# Patient Record
Sex: FEMALE | Race: WHITE | NOT HISPANIC OR LATINO | Employment: OTHER | URBAN - METROPOLITAN AREA
[De-identification: names, ages, dates, MRNs, and addresses within clinical notes are randomized per-mention and may not be internally consistent; named-entity substitution may affect disease eponyms.]

---

## 2017-07-07 ENCOUNTER — ALLSCRIPTS OFFICE VISIT (OUTPATIENT)
Dept: OTHER | Facility: OTHER | Age: 77
End: 2017-07-07

## 2017-07-07 DIAGNOSIS — Z12.31 ENCOUNTER FOR SCREENING MAMMOGRAM FOR MALIGNANT NEOPLASM OF BREAST: ICD-10-CM

## 2017-07-07 DIAGNOSIS — N64.4 MASTODYNIA: ICD-10-CM

## 2017-07-10 ENCOUNTER — TRANSCRIBE ORDERS (OUTPATIENT)
Dept: ADMINISTRATIVE | Facility: HOSPITAL | Age: 77
End: 2017-07-10

## 2017-07-10 DIAGNOSIS — R01.1 CARDIAC MURMUR: Primary | ICD-10-CM

## 2017-07-11 ENCOUNTER — HOSPITAL ENCOUNTER (OUTPATIENT)
Dept: MAMMOGRAPHY | Facility: CLINIC | Age: 77
Discharge: HOME | End: 2017-07-11
Payer: MEDICARE

## 2017-07-11 DIAGNOSIS — N64.4 MASTODYNIA: ICD-10-CM

## 2017-07-11 DIAGNOSIS — Z12.31 ENCOUNTER FOR MAMMOGRAM TO ESTABLISH BASELINE MAMMOGRAM: ICD-10-CM

## 2017-07-11 PROCEDURE — 77063 BREAST TOMOSYNTHESIS BI: CPT

## 2017-07-11 PROCEDURE — G0206 DX MAMMO INCL CAD UNI: HCPCS

## 2017-07-11 PROCEDURE — G0202 SCR MAMMO BI INCL CAD: HCPCS

## 2017-07-11 PROCEDURE — G0279 TOMOSYNTHESIS, MAMMO: HCPCS

## 2017-07-19 ENCOUNTER — HOSPITAL ENCOUNTER (OUTPATIENT)
Dept: NON INVASIVE DIAGNOSTICS | Facility: HOSPITAL | Age: 77
Discharge: HOME | End: 2017-07-19
Payer: MEDICARE

## 2017-07-19 DIAGNOSIS — R01.1 CARDIAC MURMUR: ICD-10-CM

## 2017-07-19 PROCEDURE — 93306 TTE W/DOPPLER COMPLETE: CPT

## 2017-07-26 ENCOUNTER — GENERIC CONVERSION - ENCOUNTER (OUTPATIENT)
Dept: OTHER | Facility: OTHER | Age: 77
End: 2017-07-26

## 2017-09-19 ENCOUNTER — HOSPITAL ENCOUNTER (OUTPATIENT)
Dept: INFUSION CENTER | Facility: HOSPITAL | Age: 77
Discharge: HOME | End: 2017-09-19
Payer: MEDICARE

## 2017-09-19 VITALS
RESPIRATION RATE: 16 BRPM | HEART RATE: 64 BPM | SYSTOLIC BLOOD PRESSURE: 159 MMHG | TEMPERATURE: 97.1 F | DIASTOLIC BLOOD PRESSURE: 60 MMHG

## 2017-09-19 PROCEDURE — 96401 CHEMO ANTI-NEOPL SQ/IM: CPT

## 2017-09-19 RX ADMIN — DENOSUMAB 60 MG: 60 INJECTION SUBCUTANEOUS at 13:41

## 2018-01-12 NOTE — RESULT NOTES
Verified Results  MAMMO SCREENING LEFT W 3D & CAD [id number] 22:35CV Paul Hatch     Test Name Result Flag Reference   MAMMO SCREENING LEFT W 3D & CAD (Report)     Patient History:   Patient is postmenopausal    Family history of endometrial cancer at age 36 in mother, unknown   cancer at age [de-identified] in father  Took hormonal contraceptives for 3 years  Took estrogen for 17    years  Patient is a former smoker, and smoked for 5 years  Patient's    BMI is 20 9  Reason for exam: clinical finding  Mammo Diagnostic Right W DBT and CAD: July 11, 2017 - Check In #:   [de-identified]   2D/3D Procedure   3D views: MLO view(s) were taken of the right breast    2D views: CC and MLO view(s) were taken of the right breast        Technologist: ZAYRA Pat (R)(M)   There are scattered fibroglandular densities  No dominant soft tissue mass, architectural distortion or    suspicious calcifications are noted in either breast  The skin    and nipple contours are within normal limits  No significant changes when compared with prior studies  Mammo Screening Left W DBT and CAD: July 11, 2017 - Check In #:    [de-identified]   2D/3D Procedure   3D views: MLO view(s) were taken of the left breast    2D views: CC and MLO view(s) were taken of the left breast        Technologist: ZAYRA Pat (R)(M)   There are scattered fibroglandular densities  Bilateral digital mammography was performed as a baseline study  No dominant soft tissue mass, architectural distortion or    suspicious calcifications are noted in either breast   The skin    and nipple contours are within normal limits  ACR BI-RADSï¾® Assessments: BiRad:1 - Negative (Overall)   Right breast Right Diag Mamm: Right breast is BiRad:1 - negative  Left breast 3-D L Scrn Mamm: Left breast is BiRad:1 - negative  Recommendation:   Routine screening mammogram of both breasts in 1 year  A    reminder letter will be scheduled       The patient is scheduled in a reminder system  8-10% of cancers will be missed on mammography  Management of a    palpable abnormality must be based on clinical grounds  Patients   will be notified of their results via letter from our facility  Accredited by [employer] and FDA  Transcription Location: ZAYRA Andersen 98: [id number]     Risk Value(s):   Tyrer-Cuzick 10 Year: 2 700%, Tyrer-Cuzick Lifetime: 2 700%,    Myriad Table: 1 5%, CHRISTINA 5 Year: 1 4%, NCI Lifetime: 2 9%     [facility] DIAGNOSTIC RIGHT W 3D & CAD [id number] 44:55OT Creola Rast Order Number: [id number]    - Patient Instructions: To schedule this appointment, please contact Central Scheduling at [phone]  Do not wear any perfume, powder, lotion or deodorant on breast or underarm area  Please bring your doctors order, referral (if needed) and insurance information with you on the day of the test  Failure to bring this information may result in this test being rescheduled  Arrive 15 minutes prior to your appointment time to register  On the day of your test, please bring any prior mammogram or breast studies with you that were not performed at a [facility]  Failure to bring prior exams may result in your test needing to be rescheduled  Test Name Result Flag Reference   [facility] DIAGNOSTIC RIGHT W 3D & CAD (Report)     Patient History:   Patient is postmenopausal    Family history of endometrial cancer at age 36 in mother, unknown   cancer at age [de-identified] in father  Took hormonal contraceptives for 3 years  Took estrogen for 17    years  Patient is a former smoker, and smoked for 5 years  Patient's    BMI is 20 9  Reason for exam: clinical finding       Mammo Diagnostic Right W DBT and CAD: July 11, 2017 - Check In #:   [de-identified]   2D/3D Procedure   3D views: MLO view(s) were taken of the right breast    2D views: CC and MLO view(s) were taken of the right breast        Technologist: Kishan Mccord R  T (R)(M)   There are scattered fibroglandular densities  No dominant soft tissue mass, architectural distortion or    suspicious calcifications are noted in either breast  The skin    and nipple contours are within normal limits  No significant changes when compared with prior studies  Mammo Screening Left W DBT and CAD: July 11, 2017 - Check In #:    [de-identified]   2D/3D Procedure   3D views: MLO view(s) were taken of the left breast    2D views: CC and MLO view(s) were taken of the left breast        Technologist: ZAYRA Olea T (R)(M)   There are scattered fibroglandular densities  Bilateral digital mammography was performed as a baseline study  No dominant soft tissue mass, architectural distortion or    suspicious calcifications are noted in either breast   The skin    and nipple contours are within normal limits  ACR BI-RADSï¾® Assessments: BiRad:1 - Negative (Overall)   Right breast Right Diag Mamm: Right breast is BiRad:1 - negative  Left breast 3-D L Scrn Mamm: Left breast is BiRad:1 - negative  Recommendation:   Routine screening mammogram of both breasts in 1 year  A    reminder letter will be scheduled  The patient is scheduled in a reminder system  8-10% of cancers will be missed on mammography  Management of a    palpable abnormality must be based on clinical grounds  Patients   will be notified of their results via letter from our facility  Accredited by [employer] and FDA       Transcription Location: ZAYRA Andersen 98: [id number]     Risk Value(s):   Tyrer-Cuzick 10 Year: 2 700%, Tyrer-Cuzick Lifetime: 2 700%,    Myriad Table: 1 5%, CHRISTINA 5 Year: 1 4%, NCI Lifetime: 2 9%

## 2018-01-14 VITALS
SYSTOLIC BLOOD PRESSURE: 124 MMHG | BODY MASS INDEX: 20.82 KG/M2 | HEIGHT: 63 IN | DIASTOLIC BLOOD PRESSURE: 84 MMHG | WEIGHT: 117.5 LBS

## 2018-01-14 NOTE — RESULT NOTES
Message   Recorded as Task   Date: 08/05/2016 02:57 PM, Created By: Dulce Dinh   Task Name: Document Appointment   Assigned To: Dulce Dinh   Regarding Patient: Brielle Caldwell, Status: Active   Comment:    Dulce Dinh - 05 Aug 2016 2:57 PM     TASK CREATED  Pt called stated she will to cx'l her proc for 08 10 2016; due to having cold feet; she would like to think about it for a few weeks and will call back when ready          Signatures   Electronically signed by : Sun Daily, ; Aug  8 2016  2:10PM EST                       (Author)

## 2018-01-16 NOTE — RESULT NOTES
Message   Recorded as Task   Date: 08/05/2016 02:57 PM, Created By: Darrian Park   Task Name: Document Appointment   Assigned To: Darrian Park   Regarding Patient: Madai Gold, Status: Active   Comment:    Darrian Park - 05 Aug 2016 2:57 PM     TASK CREATED  Pt called stated she will to cx'l her proc for 08 10 2016; due to having cold feet; she would like to think about it for a few weeks and will call back when ready          Signatures   Electronically signed by : Rica López, ; Aug  5 2016  2:57PM EST                       (Author)

## 2018-05-07 ENCOUNTER — HOSPITAL ENCOUNTER (OUTPATIENT)
Dept: INFUSION CENTER | Facility: HOSPITAL | Age: 78
Discharge: HOME | End: 2018-05-07
Payer: MEDICARE

## 2018-05-07 PROCEDURE — 96401 CHEMO ANTI-NEOPL SQ/IM: CPT

## 2018-05-07 RX ADMIN — DENOSUMAB 60 MG: 60 INJECTION SUBCUTANEOUS at 10:31

## 2018-11-08 ENCOUNTER — TRANSCRIBE ORDERS (OUTPATIENT)
Dept: ADMINISTRATIVE | Facility: HOSPITAL | Age: 78
End: 2018-11-08

## 2018-11-08 ENCOUNTER — APPOINTMENT (OUTPATIENT)
Dept: LAB | Age: 78
End: 2018-11-08
Payer: MEDICARE

## 2018-11-08 DIAGNOSIS — M81.0 SENILE OSTEOPOROSIS: ICD-10-CM

## 2018-11-08 DIAGNOSIS — M81.0 SENILE OSTEOPOROSIS: Primary | ICD-10-CM

## 2018-11-08 LAB — CALCIUM SERPL-MCNC: 9.7 MG/DL (ref 8.3–10.1)

## 2018-11-08 PROCEDURE — 36415 COLL VENOUS BLD VENIPUNCTURE: CPT

## 2018-11-08 PROCEDURE — 82310 ASSAY OF CALCIUM: CPT

## 2019-01-11 ENCOUNTER — APPOINTMENT (OUTPATIENT)
Dept: LAB | Age: 79
End: 2019-01-11
Payer: MEDICARE

## 2019-01-11 ENCOUNTER — TRANSCRIBE ORDERS (OUTPATIENT)
Dept: ADMINISTRATIVE | Facility: HOSPITAL | Age: 79
End: 2019-01-11

## 2019-01-11 DIAGNOSIS — D53.9 MACROCYTIC ANEMIA: ICD-10-CM

## 2019-01-11 DIAGNOSIS — D53.9 MACROCYTIC ANEMIA: Primary | ICD-10-CM

## 2019-01-11 LAB
BASOPHILS # BLD AUTO: 0.03 THOUSANDS/ΜL (ref 0–0.1)
BASOPHILS NFR BLD AUTO: 1 % (ref 0–1)
EOSINOPHIL # BLD AUTO: 0.02 THOUSAND/ΜL (ref 0–0.61)
EOSINOPHIL NFR BLD AUTO: 1 % (ref 0–6)
ERYTHROCYTE [DISTWIDTH] IN BLOOD BY AUTOMATED COUNT: 14.2 % (ref 11.6–15.1)
FERRITIN SERPL-MCNC: 75 NG/ML (ref 8–388)
HCT VFR BLD AUTO: 37.1 % (ref 34.8–46.1)
HGB BLD-MCNC: 11.8 G/DL (ref 11.5–15.4)
IMM GRANULOCYTES # BLD AUTO: 0.02 THOUSAND/UL (ref 0–0.2)
IMM GRANULOCYTES NFR BLD AUTO: 1 % (ref 0–2)
LYMPHOCYTES # BLD AUTO: 0.58 THOUSANDS/ΜL (ref 0.6–4.47)
LYMPHOCYTES NFR BLD AUTO: 19 % (ref 14–44)
MCH RBC QN AUTO: 34.6 PG (ref 26.8–34.3)
MCHC RBC AUTO-ENTMCNC: 31.8 G/DL (ref 31.4–37.4)
MCV RBC AUTO: 109 FL (ref 82–98)
MONOCYTES # BLD AUTO: 0.43 THOUSAND/ΜL (ref 0.17–1.22)
MONOCYTES NFR BLD AUTO: 14 % (ref 4–12)
NEUTROPHILS # BLD AUTO: 1.91 THOUSANDS/ΜL (ref 1.85–7.62)
NEUTS SEG NFR BLD AUTO: 64 % (ref 43–75)
NRBC BLD AUTO-RTO: 0 /100 WBCS
PLATELET # BLD AUTO: 264 THOUSANDS/UL (ref 149–390)
PMV BLD AUTO: 10.2 FL (ref 8.9–12.7)
RBC # BLD AUTO: 3.41 MILLION/UL (ref 3.81–5.12)
WBC # BLD AUTO: 2.99 THOUSAND/UL (ref 4.31–10.16)

## 2019-01-11 PROCEDURE — 82728 ASSAY OF FERRITIN: CPT

## 2019-01-11 PROCEDURE — 85025 COMPLETE CBC W/AUTO DIFF WBC: CPT

## 2019-01-11 PROCEDURE — 36415 COLL VENOUS BLD VENIPUNCTURE: CPT

## 2019-07-10 NOTE — PROGRESS NOTES
Aorto-iliac atherosclerosis [facility])  78-year-old female with a history GERD, general anxiety disorder, rheumatoid arthritis, PMR, chronic steroid therapy,  severe lumbar spinal stenosis, s/p complex fusion with chronic pain syndrome and aortoiliac atherosclerosis on CT imaging who presents to the office on self-referral for evaluation of possible aortoiliac disease  Patient asymptomatic with readily palpable lower extremity pulses  -will obtain AOIL for baseline study  -return to office after noninvasive imaging for review results  -continue ASA  -instructed to contact the office in the interim with any questions, concerns or new symptoms    [facility]  Rheumatoid arthritis and polymyalgia rheumatica, on chronic steroid therapy  -stable  -continue chronic steroid therapy per Rheumatology  -continue exercise program  -management per Rheumatology and PCP    Generalized anxiety disorder  -continue Paxil  -management per PCP      Assessment/Plan   Diagnoses and all orders for this visit:    Aorto-iliac atherosclerosis ([facility] )  -     VAS abdominal aorta/iliacs; complete study; Future    Polymyalgia rheumatica (HCC)    Generalized anxiety disorder    Other orders  -     aspirin 81 MG tablet; Take 162 mg by mouth daily  -     acetaminophen (TYLENOL) 650 mg CR tablet; Take 650 mg by mouth 2 (two) times a day  -     B Complex-C-Folic Acid (XVITE) 1 MG TABS  -     denosumab (PROLIA) 60 mg/mL; Inject under the skin  -     estradiol (ESTRACE VAGINAL) 0 1 mg/g vaginal cream; Insert 2 g into the vagina daily  -     famotidine (PEPCID) 40 MG tablet; Take by mouth  -     omeprazole (PriLOSEC) 20 mg delayed release capsule  -     pravastatin (PRAVACHOL) 20 mg tablet; Take 20 mg by mouth daily  -     ranitidine (ZANTAC) 150 mg tablet;  Take 150 mg by mouth  -     triamcinolone (KENALOG) 0 5 % cream; Apply topically Three times a day  -     Methotrexate (XATMEP) 2 5 MG/ML SOLN; Take by mouth  -     leucovorin (WELLCOVORIN) 10 MG tablet  -     hyoscyamine (ANASPAZ,LEVSIN) 0 125 MG tablet; Take 0 125 mg by mouth every 4 (four) hours as needed        Chief Complaint   Patient presents with    Consult     Stenosis Iliac Artery       Subjective   Patient ID: Karolina Rubin is a 67 y o  female  Pt is new to our practice and is here to be evaluated for stenosis in the iliac artery  Pt has had no recent testing for this condition  Pt denies any leg pain, swelling or claudication  Pt denies numbness or tingling in her legs  Pt is currently taking ASA  76-year-old female with a history GERD, general anxiety disorder, rheumatoid arthritis, PMR, chronic steroid therapy and significant lumbar spinal stenosis, s/p fusion with chronic pain syndrome who presents to the office on self-referral for evaluation of possible aortoiliac disease  The patient was encouraged to come for evaluation after watching a "Talk with your Doctor" episode featuring Dr Marvin Escalera and vascular disease  Patient reports iliac disease on imaging studies in the past   Patient has no vascular imaging studies for review or CT scans within the [facility]  However, reports from CT of the abdomen pelvis with contrast at [facility] 7/7/2015 and 3/24/2017 has been reviewed which note calcification of the aorto iliac segments without notation of stenosis, occlusion or aneurysmal disease  No CT images available for review  The patient denies claudication, rest pain, tissue loss, abdominal pain, back pain or postprandial abdominal pain  Patient denies family history of aneurysmal disease  Patient remains active and works out at [employer] 2 days a week  The following portions of the patient's history were reviewed and updated as appropriate: allergies, current medications, past family history, past medical history, past social history, past surgical history and problem list     Review of Systems   Constitutional: Negative  HENT: Negative  Eyes: Negative  Respiratory: Negative  Cardiovascular: Negative  Gastrointestinal: Negative  Endocrine: Negative  Genitourinary: Negative  Musculoskeletal: Positive for back pain  Skin: Negative  Allergic/Immunologic: Negative  Neurological: Negative  Hematological: Bruises/bleeds easily  Psychiatric/Behavioral: Negative  I have personally reviewed the ROS entered by MA and agree as documented  Patient Active Problem List   Diagnosis    GERD (gastroesophageal reflux disease)    Generalized anxiety disorder    Rheumatoid arthritis ([facility] [address] [facility] )    Polymyalgia rheumatica (HCC)    Aorto-iliac atherosclerosis ([facility] )       Past Surgical History:   Procedure Laterality Date    LUMBAR FUSION         History reviewed  No pertinent family history      Social History     Socioeconomic History    Marital status:      Spouse name: Not on file    Number of children: Not on file    Years of education: Not on file    Highest education level: Not on file   Occupational History    Not on file   Social Needs    Financial resource strain: Not on file    Food insecurity:     Worry: Not on file     Inability: Not on file    Transportation needs:     Medical: Not on file     Non-medical: Not on file   Tobacco Use    Smoking status: Former Smoker   Substance and Sexual Activity    Alcohol use: No    Drug use: No    Sexual activity: Not on file   Lifestyle    Physical activity:     Days per week: Not on file     Minutes per session: Not on file    Stress: Not on file   Relationships    Social connections:     Talks on phone: Not on file     Gets together: Not on file     Attends [employer] service: Not on file     Active member of club or organization: Not on file     Attends meetings of clubs or organizations: Not on file     Relationship status: Not on file    Intimate partner violence:     Fear of current or ex partner: Not on file     Emotionally abused: Not on file Physically abused: Not on file     Forced sexual activity: Not on file   Other Topics Concern    Not on file   Social History Narrative    Not on file       Allergies   Allergen Reactions    Erythromycin     Sulfa Antibiotics          Current Outpatient Medications:     acetaminophen (TYLENOL) 650 mg CR tablet, Take 650 mg by mouth 2 (two) times a day, Disp: , Rfl:     aspirin (ECOTRIN LOW STRENGTH) 81 mg EC tablet, Take 81 mg by mouth 2 (two) times a day  , Disp: , Rfl:     calcium & magnesium carbonates (MYLANTA) 311-232 MG per tablet, Take 1 tablet by mouth daily  , Disp: , Rfl:     denosumab (PROLIA) 60 mg/mL, Inject under the skin, Disp: , Rfl:     esomeprazole (NexIUM) 40 MG capsule, Take 40 mg by mouth 2 (two) times a day before meals  , Disp: , Rfl:     estradiol (ESTRACE VAGINAL) 0 1 mg/g vaginal cream, Insert 2 g into the vagina daily, Disp: , Rfl:     famotidine (PEPCID) 40 MG tablet, Take by mouth, Disp: , Rfl:     ferrous sulfate 325 (65 Fe) mg tablet, Take 325 mg by mouth daily with breakfast , Disp: , Rfl:     hyoscyamine (ANASPAZ,LEVSIN) 0 125 MG tablet, Take 0 125 mg by mouth every 4 (four) hours as needed, Disp: , Rfl:     leucovorin (WELLCOVORIN) 10 MG tablet, , Disp: , Rfl: 2    Methotrexate (XATMEP) 2 5 MG/ML SOLN, Take by mouth, Disp: , Rfl:     methotrexate 7 5 MG tablet, Take 5 mg by mouth once a week , Disp: , Rfl:     omeprazole (PriLOSEC) 20 mg delayed release capsule, , Disp: , Rfl: 1    PARoxetine (PAXIL) 20 mg tablet, Take 20 mg by mouth every morning , Disp: , Rfl:     pravastatin (PRAVACHOL) 20 mg tablet, Take 20 mg by mouth daily, Disp: , Rfl:     predniSONE 5 mg tablet, Take 5 mg by mouth daily  , Disp: , Rfl:     ranitidine (ZANTAC) 150 mg tablet, Take 150 mg by mouth, Disp: , Rfl:     triamcinolone (KENALOG) 0 5 % cream, Apply topically Three times a day, Disp: , Rfl:     aspirin 81 MG tablet, Take 162 mg by mouth daily, Disp: , Rfl:     B Complex-C-Folic Acid (XVITE) 1 MG TABS, , Disp: , Rfl: 1    chlordiazepoxide-clidinium (LIBRAX) 5-2 5 mg per capsule, Take 1 capsule by mouth daily  , Disp: , Rfl:     traMADol (ULTRAM) 50 mg tablet, Take 50 mg by mouth 2 (two) times a day , Disp: , Rfl:     Objective      Imaging studies:  -No vascular imaging studies for review  -CT of the chest, abdomen pelvis with contrast at [facility] 2/18/2018 without comment on vascular structures  CT of the abdomen pelvis with contrast 3/24/2017 and 7/7/2015 at [facility] note calcification of the aortoiliac segments  Physical Exam:    General appearance: alert and oriented, in no acute distress  Skin: Skin color, texture, turgor normal  No rashes or lesions  Neurologic: Grossly normal  Head: Normocephalic, without obvious abnormality, atraumatic  Eyes: PERRL, EOMI, sclerae nonicteric  Throat: lips, mucosa, and tongue normal; teeth and gums normal  Neck: no adenopathy, no carotid bruit, no JVD, supple, symmetrical, trachea midline and thyroid not enlarged, symmetric, no tenderness/mass/nodules  Back: symmetric, no curvature  ROM normal  No CVA tenderness  Lungs: clear to auscultation bilaterally  Chest wall: no tenderness  Heart: regular rate and rhythm, S1, S2 normal, no murmur, click, rub or gallop  Abdomen: soft, non-tender; bowel sounds normal; no masses,  no organomegaly and Nondistended  No abdominal bruits    No femoral bruits  Extremities: extremities normal, warm and well-perfused; no cyanosis, clubbing, or edema, no ulcers, gangrene or trophic changes and Bilateral lower extremities motor and sensory intact    Pulse exam:  Radial: Right: 2+ Left[de-identified] 2+  Femoral: Right: 2+ Left: 2+  Popliteal: Right: 2+ Left: 2+  DP: Right: 2+ Left: 2+  PT: Right: 1+ Left: 1+

## 2019-07-11 ENCOUNTER — OFFICE VISIT (OUTPATIENT)
Dept: VASCULAR SURGERY | Facility: CLINIC | Age: 79
End: 2019-07-11
Payer: MEDICARE

## 2019-07-11 VITALS
SYSTOLIC BLOOD PRESSURE: 116 MMHG | BODY MASS INDEX: 21.79 KG/M2 | RESPIRATION RATE: 16 BRPM | HEIGHT: 63 IN | WEIGHT: 123 LBS | DIASTOLIC BLOOD PRESSURE: 74 MMHG | HEART RATE: 60 BPM | TEMPERATURE: 96.6 F

## 2019-07-11 DIAGNOSIS — I70.0 AORTO-ILIAC ATHEROSCLEROSIS (HCC): Primary | ICD-10-CM

## 2019-07-11 DIAGNOSIS — I70.8 AORTO-ILIAC ATHEROSCLEROSIS (HCC): Primary | ICD-10-CM

## 2019-07-11 DIAGNOSIS — F41.1 GENERALIZED ANXIETY DISORDER: ICD-10-CM

## 2019-07-11 DIAGNOSIS — M35.3 POLYMYALGIA RHEUMATICA (HCC): ICD-10-CM

## 2019-07-11 PROCEDURE — 99204 OFFICE O/P NEW MOD 45 MIN: CPT | Performed by: PHYSICIAN ASSISTANT

## 2019-07-11 RX ORDER — PRAVASTATIN SODIUM 20 MG
20 TABLET ORAL DAILY
COMMUNITY
Start: 2018-12-20 | End: 2019-12-20

## 2019-07-11 RX ORDER — OMEPRAZOLE 20 MG/1
CAPSULE, DELAYED RELEASE ORAL
Refills: 1 | COMMUNITY
Start: 2019-06-01

## 2019-07-11 RX ORDER — SENNOSIDES 8.6 MG
650 CAPSULE ORAL 2 TIMES DAILY
COMMUNITY

## 2019-07-11 RX ORDER — RANITIDINE 150 MG/1
150 TABLET ORAL
COMMUNITY
Start: 2018-10-12

## 2019-07-11 RX ORDER — TRIAMCINOLONE ACETONIDE 5 MG/G
CREAM TOPICAL 3 TIMES DAILY
COMMUNITY
Start: 2019-06-20 | End: 2020-06-19

## 2019-07-11 RX ORDER — LEUCOVORIN CALCIUM 10 MG/1
TABLET ORAL
Refills: 2 | COMMUNITY
Start: 2019-06-25

## 2019-07-11 RX ORDER — FAMOTIDINE 40 MG/1
TABLET, FILM COATED ORAL
COMMUNITY

## 2019-07-11 RX ORDER — HYOSCYAMINE SULFATE 0.125 MG
0.12 TABLET ORAL EVERY 4 HOURS PRN
COMMUNITY
Start: 2015-08-12

## 2019-07-11 RX ORDER — VIT B COMPLX/FOLIC AC/C/BIOTIN 1-100-300
TABLET ORAL
Refills: 1 | COMMUNITY
Start: 2019-07-10

## 2019-07-11 RX ORDER — ESTRADIOL 0.1 MG/G
2 CREAM VAGINAL DAILY
COMMUNITY
Start: 2019-01-25 | End: 2020-01-25

## 2019-07-11 NOTE — ASSESSMENT & PLAN NOTE
Rheumatoid arthritis and polymyalgia rheumatica, on chronic steroid therapy  -stable  -continue chronic steroid therapy per Rheumatology  -continue exercise program  -management per Rheumatology and PCP

## 2019-07-11 NOTE — LETTER
July 11, 2019     Irwin Tran, [address] [facility]    Patient: Shon Huang   YOB: 1940   Date of Visit: 7/11/2019       Dear Dr Yadiel Taveras:    I had the pleasure seeing your patient, Meng Holden, and self-referral in the office today related to concern for aortoiliac disease  Below are my notes for this consultation  If you have questions, please do not hesitate to call me  I look forward to following your patient along with you  Sincerely,        Trish Arriaga PA-C        CC: No Recipients  Kade William  7/11/2019 11:15 AM  Sign at close encounter  Aorto-iliac atherosclerosis [facility])  29-year-old female with a history GERD, general anxiety disorder, rheumatoid arthritis, PMR, chronic steroid therapy,  severe lumbar spinal stenosis, s/p complex fusion with chronic pain syndrome and aortoiliac atherosclerosis on CT imaging who presents to the office on self-referral for evaluation of possible aortoiliac disease  Patient asymptomatic with readily palpable lower extremity pulses  -will obtain AOIL for baseline study  -return to office after noninvasive imaging for review results  -continue ASA  -instructed to contact the office in the interim with any questions, concerns or new symptoms    Polymyalgia rheumatica ([address] )  Rheumatoid arthritis and polymyalgia rheumatica, on chronic steroid therapy  -stable  -continue chronic steroid therapy per Rheumatology  -continue exercise program  -management per Rheumatology and PCP    Generalized anxiety disorder  -continue Paxil  -management per PCP      Assessment/Plan   Diagnoses and all orders for this visit:    Aorto-iliac atherosclerosis ([address] )  -     VAS abdominal aorta/iliacs; complete study; Future    Polymyalgia rheumatica (HCC)    Generalized anxiety disorder    Other orders  -     aspirin 81 MG tablet; Take 162 mg by mouth daily  -     acetaminophen (TYLENOL) 650 mg CR tablet;  Take 650 mg by mouth 2 (two) times a day  -     B Complex-C-Folic Acid (XVITE) 1 MG TABS  -     denosumab (PROLIA) 60 mg/mL; Inject under the skin  -     estradiol (ESTRACE VAGINAL) 0 1 mg/g vaginal cream; Insert 2 g into the vagina daily  -     famotidine (PEPCID) 40 MG tablet; Take by mouth  -     omeprazole (PriLOSEC) 20 mg delayed release capsule  -     pravastatin (PRAVACHOL) 20 mg tablet; Take 20 mg by mouth daily  -     ranitidine (ZANTAC) 150 mg tablet; Take 150 mg by mouth  -     triamcinolone (KENALOG) 0 5 % cream; Apply topically Three times a day  -     Methotrexate (XATMEP) 2 5 MG/ML SOLN; Take by mouth  -     leucovorin (WELLCOVORIN) 10 MG tablet  -     hyoscyamine (ANASPAZ,LEVSIN) 0 125 MG tablet; Take 0 125 mg by mouth every 4 (four) hours as needed        Chief Complaint   Patient presents with    Consult     Stenosis Iliac Artery       Subjective   Patient ID: Morgan Winter is a 67 y o  female  Pt is new to our practice and is here to be evaluated for stenosis in the iliac artery  Pt has had no recent testing for this condition  Pt denies any leg pain, swelling or claudication  Pt denies numbness or tingling in her legs  Pt is currently taking ASA  76-year-old female with a history GERD, general anxiety disorder, rheumatoid arthritis, PMR, chronic steroid therapy and significant lumbar spinal stenosis, s/p fusion with chronic pain syndrome who presents to the office on self-referral for evaluation of possible aortoiliac disease  The patient was encouraged to come for evaluation after watching a "Talk with your Doctor" episode featuring Dr Jeri Kohli and vascular disease  Patient reports iliac disease on imaging studies in the past   Patient has no vascular imaging studies for review or CT scans within the [facility] system    However, reports from CT of the abdomen pelvis with contrast at [facility] 7/7/2015 and 3/24/2017 has been reviewed which note calcification of the aorto iliac segments without notation of stenosis, occlusion or aneurysmal disease  No CT images available for review  The patient denies claudication, rest pain, tissue loss, abdominal pain, back pain or postprandial abdominal pain  Patient denies family history of aneurysmal disease  Patient remains active and works out at [employer] 2 days a week  The following portions of the patient's history were reviewed and updated as appropriate: allergies, current medications, past family history, past medical history, past social history, past surgical history and problem list     Review of Systems   Constitutional: Negative  HENT: Negative  Eyes: Negative  Respiratory: Negative  Cardiovascular: Negative  Gastrointestinal: Negative  Endocrine: Negative  Genitourinary: Negative  Musculoskeletal: Positive for back pain  Skin: Negative  Allergic/Immunologic: Negative  Neurological: Negative  Hematological: Bruises/bleeds easily  Psychiatric/Behavioral: Negative  I have personally reviewed the ROS entered by MA and agree as documented  Patient Active Problem List   Diagnosis    GERD (gastroesophageal reflux disease)    Generalized anxiety disorder    Rheumatoid arthritis ([facility] )    Polymyalgia rheumatica (HCC)    Aorto-iliac atherosclerosis ([facility] )       Past Surgical History:   Procedure Laterality Date    LUMBAR FUSION         History reviewed  No pertinent family history      Social History     Socioeconomic History    Marital status:      Spouse name: Not on file    Number of children: Not on file    Years of education: Not on file    Highest education level: Not on file   Occupational History    Not on file   Social Needs    Financial resource strain: Not on file    Food insecurity:     Worry: Not on file     Inability: Not on file    Transportation needs:     Medical: Not on file     Non-medical: Not on file   Tobacco Use    Smoking status: Former Smoker   Substance and Sexual Activity    Alcohol use: No    Drug use: No    Sexual activity: Not on file   Lifestyle    Physical activity:     Days per week: Not on file     Minutes per session: Not on file    Stress: Not on file   Relationships    Social connections:     Talks on phone: Not on file     Gets together: Not on file     Attends Yarsani service: Not on file     Active member of club or organization: Not on file     Attends meetings of clubs or organizations: Not on file     Relationship status: Not on file    Intimate partner violence:     Fear of current or ex partner: Not on file     Emotionally abused: Not on file     Physically abused: Not on file     Forced sexual activity: Not on file   Other Topics Concern    Not on file   Social History Narrative    Not on file       Allergies   Allergen Reactions    Erythromycin     Sulfa Antibiotics          Current Outpatient Medications:     acetaminophen (TYLENOL) 650 mg CR tablet, Take 650 mg by mouth 2 (two) times a day, Disp: , Rfl:     aspirin (ECOTRIN LOW STRENGTH) 81 mg EC tablet, Take 81 mg by mouth 2 (two) times a day  , Disp: , Rfl:     calcium & magnesium carbonates (MYLANTA) 311-232 MG per tablet, Take 1 tablet by mouth daily  , Disp: , Rfl:     denosumab (PROLIA) 60 mg/mL, Inject under the skin, Disp: , Rfl:     esomeprazole (NexIUM) 40 MG capsule, Take 40 mg by mouth 2 (two) times a day before meals  , Disp: , Rfl:     estradiol (ESTRACE VAGINAL) 0 1 mg/g vaginal cream, Insert 2 g into the vagina daily, Disp: , Rfl:     famotidine (PEPCID) 40 MG tablet, Take by mouth, Disp: , Rfl:     ferrous sulfate 325 (65 Fe) mg tablet, Take 325 mg by mouth daily with breakfast , Disp: , Rfl:     hyoscyamine (ANASPAZ,LEVSIN) 0 125 MG tablet, Take 0 125 mg by mouth every 4 (four) hours as needed, Disp: , Rfl:     leucovorin (WELLCOVORIN) 10 MG tablet, , Disp: , Rfl: 2    Methotrexate (XATMEP) 2 5 MG/ML SOLN, Take by mouth, Disp: , Rfl:     methotrexate 7 5 MG tablet, Take 5 mg by mouth once a week , Disp: , Rfl:     omeprazole (PriLOSEC) 20 mg delayed release capsule, , Disp: , Rfl: 1    PARoxetine (PAXIL) 20 mg tablet, Take 20 mg by mouth every morning , Disp: , Rfl:     pravastatin (PRAVACHOL) 20 mg tablet, Take 20 mg by mouth daily, Disp: , Rfl:     predniSONE 5 mg tablet, Take 5 mg by mouth daily  , Disp: , Rfl:     ranitidine (ZANTAC) 150 mg tablet, Take 150 mg by mouth, Disp: , Rfl:     triamcinolone (KENALOG) 0 5 % cream, Apply topically Three times a day, Disp: , Rfl:     aspirin 81 MG tablet, Take 162 mg by mouth daily, Disp: , Rfl:     B Complex-C-Folic Acid (XVITE) 1 MG TABS, , Disp: , Rfl: 1    chlordiazepoxide-clidinium (LIBRAX) 5-2 5 mg per capsule, Take 1 capsule by mouth daily  , Disp: , Rfl:     traMADol (ULTRAM) 50 mg tablet, Take 50 mg by mouth 2 (two) times a day , Disp: , Rfl:     Objective      Imaging studies:  -No vascular imaging studies for review  -CT of the chest, abdomen pelvis with contrast at [address] [place] [address] [address] 2/18/2018 without comment on vascular structures  CT of the abdomen pelvis with contrast 3/24/2017 and 7/7/2015 at [facility] note calcification of the aortoiliac segments  Physical Exam:    General appearance: alert and oriented, in no acute distress  Skin: Skin color, texture, turgor normal  No rashes or lesions  Neurologic: Grossly normal  Head: Normocephalic, without obvious abnormality, atraumatic  Eyes: PERRL, EOMI, sclerae nonicteric  Throat: lips, mucosa, and tongue normal; teeth and gums normal  Neck: no adenopathy, no carotid bruit, no JVD, supple, symmetrical, trachea midline and thyroid not enlarged, symmetric, no tenderness/mass/nodules  Back: symmetric, no curvature  ROM normal  No CVA tenderness  Lungs: clear to auscultation bilaterally  Chest wall: no tenderness  Heart: regular rate and rhythm, S1, S2 normal, no murmur, click, rub or gallop  Abdomen: soft, non-tender; bowel sounds normal; no masses,  no organomegaly and Nondistended    No abdominal bruits    No femoral bruits  Extremities: extremities normal, warm and well-perfused; no cyanosis, clubbing, or edema, no ulcers, gangrene or trophic changes and Bilateral lower extremities motor and sensory intact    Pulse exam:  Radial: Right: 2+ Left[de-identified] 2+  Femoral: Right: 2+ Left: 2+  Popliteal: Right: 2+ Left: 2+  DP: Right: 2+ Left: 2+  PT: Right: 1+ Left: 1+

## 2019-08-08 ENCOUNTER — HOSPITAL ENCOUNTER (OUTPATIENT)
Dept: NON INVASIVE DIAGNOSTICS | Facility: CLINIC | Age: 79
Discharge: HOME | End: 2019-08-08
Payer: MEDICARE

## 2019-08-08 DIAGNOSIS — I70.0 AORTO-ILIAC ATHEROSCLEROSIS (HCC): ICD-10-CM

## 2019-08-08 DIAGNOSIS — I70.8 AORTO-ILIAC ATHEROSCLEROSIS (HCC): ICD-10-CM

## 2019-08-08 PROCEDURE — 93978 VASCULAR STUDY: CPT | Performed by: SURGERY

## 2019-08-08 PROCEDURE — 93978 VASCULAR STUDY: CPT

## 2019-08-12 NOTE — PROGRESS NOTES
Aorto-iliac atherosclerosis ([facility] )  71-year-old female with a history GERD, general anxiety disorder, rheumatoid arthritis, PMR, chronic steroid therapy,  severe lumbar spinal stenosis, s/p complex fusion with chronic pain syndrome and asymptomatic mild aortoiliac atherosclerosis on CT and noninvasive imaging with palpable lower extremity pulses  -no evidence arterial occlusive disease  No further imaging studies or follow-up required  -return to office PRN  -continue ASA and statin therapy  -instructed to contact the office in the future if she would develop new symptoms    Polymyalgia rheumatica (HCC)  Rheumatoid arthritis and polymyalgia rheumatica, on chronic steroid therapy  -stable  -continue chronic steroid therapy per Rheumatology  -continue exercise program  -management per Rheumatology and PCP    Generalized anxiety disorder  -continue Paxil  -management per PCP      Assessment/Plan   Diagnoses and all orders for this visit:    [facility] ([facility] )    Polymyalgia rheumatica ([facility] )    Generalized anxiety disorder        Chief Complaint   Patient presents with    Results     AOIL       Subjective   Patient ID: Morgan Winter is a 67 y o  female  Pt is here to review the results of her AOIL on 8/8/19  Pt denies any abdominal or back pain  Pt denies any nausea, vomiting or bloating after eating  Pt is currently taking ASA and Pravastatin  71-year-old female with a history GERD, general anxiety disorder, rheumatoid arthritis, PMR, chronic steroid therapy and significant lumbar spinal stenosis, s/p fusion with chronic pain syndrome who presented to the office on self-referral 1 month ago for evaluation of possible aortoiliac disease who returns for review of recent noninvasive imaging  The patient was encouraged to come for evaluation after watching a "Talk with your Doctor" episode featuring Dr Jeri Kohli and vascular disease    Patient reports iliac disease on imaging studies in the past   No vascular imaging studies for review or CT [facility]  However, reports from CT of the abdomen pelvis with contrast at [facility] 7/7/2015 and 3/24/2017 has been reviewed which noted calcification of the aortoiliac segments without notation of stenosis, occlusion or aneurysmal disease  No CT images available for review  AOIL 8/8/2019 has been reviewed and demonstrates mild diffuse atherosclerotic disease with no evidence of aortoiliac occlusive disease or AAA  Celiac stenosis noted the patient denies postprandial abdominal pain, food fear or weight loss  SMA patent  The patient denies claudication, rest pain, tissue loss, abdominal pain, back pain  Patient denies family history of aneurysmal disease  Patient remains active and works out at [employer] 2 days a week  The following portions of the patient's history were reviewed and updated as appropriate: allergies, current medications, past family history, past medical history, past social history, past surgical history and problem list     Review of Systems   Constitutional: Negative  HENT: Negative  Eyes: Negative  Respiratory: Negative  Cardiovascular: Negative  Gastrointestinal: Negative  Endocrine: Negative  Genitourinary: Negative  Musculoskeletal: Negative  Skin: Negative  Allergic/Immunologic: Negative  Neurological: Negative  Hematological: Negative  Psychiatric/Behavioral: Negative  I have personally reviewed the ROS entered by MA and agree as documented  Patient Active Problem List   Diagnosis    GERD (gastroesophageal reflux disease)    Generalized anxiety disorder    Rheumatoid arthritis ([address] )    Polymyalgia rheumatica (HCC)    Aorto-iliac atherosclerosis (HCC)       Past Surgical History:   Procedure Laterality Date    LUMBAR FUSION         No family history on file      Social History     Socioeconomic History    Marital status:      Spouse name: Not on file  Number of children: Not on file    Years of education: Not on file    Highest education level: Not on file   Occupational History    Not on file   Social Needs    Financial resource strain: Not on file    Food insecurity:     Worry: Not on file     Inability: Not on file    Transportation needs:     Medical: Not on file     Non-medical: Not on file   Tobacco Use    Smoking status: Former Smoker   Substance and Sexual Activity    Alcohol use: No    Drug use: No    Sexual activity: Not on file   Lifestyle    Physical activity:     Days per week: Not on file     Minutes per session: Not on file    Stress: Not on file   Relationships    Social connections:     Talks on phone: Not on file     Gets together: Not on file     Attends Taoism service: Not on file     Active member of club or organization: Not on file     Attends meetings of clubs or organizations: Not on file     Relationship status: Not on file    Intimate partner violence:     Fear of current or ex partner: Not on file     Emotionally abused: Not on file     Physically abused: Not on file     Forced sexual activity: Not on file   Other Topics Concern    Not on file   Social History Narrative    Not on file       Allergies   Allergen Reactions    Atorvastatin Myalgia     Pt reports severe pain     Erythromycin     Sulfa Antibiotics          Current Outpatient Medications:     acetaminophen (TYLENOL) 650 mg CR tablet, Take 650 mg by mouth 2 (two) times a day, Disp: , Rfl:     aspirin 81 MG tablet, Take 162 mg by mouth daily, Disp: , Rfl:     B Complex-C-Folic Acid (XVITE) 1 MG TABS, , Disp: , Rfl: 1    calcium & magnesium carbonates (MYLANTA) 311-232 MG per tablet, Take 1 tablet by mouth daily  , Disp: , Rfl:     chlordiazepoxide-clidinium (LIBRAX) 5-2 5 mg per capsule, Take 1 capsule by mouth daily  , Disp: , Rfl:     denosumab (PROLIA) 60 mg/mL, Inject under the skin, Disp: , Rfl:     esomeprazole (NexIUM) 40 MG capsule, Take 40 mg by mouth 2 (two) times a day before meals  , Disp: , Rfl:     estradiol (ESTRACE VAGINAL) 0 1 mg/g vaginal cream, Insert 2 g into the vagina daily, Disp: , Rfl:     famotidine (PEPCID) 40 MG tablet, Take by mouth, Disp: , Rfl:     ferrous sulfate 325 (65 Fe) mg tablet, Take 325 mg by mouth daily with breakfast , Disp: , Rfl:     hyoscyamine (ANASPAZ,LEVSIN) 0 125 MG tablet, Take 0 125 mg by mouth every 4 (four) hours as needed, Disp: , Rfl:     leucovorin (WELLCOVORIN) 10 MG tablet, , Disp: , Rfl: 2    Methotrexate (XATMEP) 2 5 MG/ML SOLN, Take by mouth, Disp: , Rfl:     methotrexate 7 5 MG tablet, Take 5 mg by mouth once a week , Disp: , Rfl:     omeprazole (PriLOSEC) 20 mg delayed release capsule, , Disp: , Rfl: 1    PARoxetine (PAXIL) 20 mg tablet, Take 20 mg by mouth every morning , Disp: , Rfl:     pravastatin (PRAVACHOL) 20 mg tablet, Take 20 mg by mouth daily, Disp: , Rfl:     predniSONE 5 mg tablet, Take 5 mg by mouth daily  , Disp: , Rfl:     ranitidine (ZANTAC) 150 mg tablet, Take 150 mg by mouth, Disp: , Rfl:     aspirin (ECOTRIN LOW STRENGTH) 81 mg EC tablet, Take 81 mg by mouth 2 (two) times a day  , Disp: , Rfl:     traMADol (ULTRAM) 50 mg tablet, Take 50 mg by mouth 2 (two) times a day , Disp: , Rfl:     triamcinolone (KENALOG) 0 5 % cream, Apply topically Three times a day, Disp: , Rfl:     Objective      Imaging studies:  AOIL 8/8/2019:  Imaging study reviewed and as described above  No evidence of significant aortoiliac occlusive disease or AAA  >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 with patent SMA  See full report below:  Unilateral     Impression       PSV (cm/s)  EDV (cm/s)  AP (cm)    Sup-Jacquelin Ao                              61          17      2 4    Px Inf-Bill Ao                           70           0      1 8    Ds Inf-Bill Ao  Diffuse Disease          99           3      1 7    Celiac         >70%                    494          65             Prox   SMA                              121 27                Right           PSV (cm/s)  EDV (cm/s)  AP (cm)   RAR    Prox BIANKA               109          13      1 2          Dist BIANKA               100           0      1 1          Internal Iliac          55                               Prox  EIA              104           0                   Dist EIA               138           0      1 0          Prox Renal              90          15           1 47       Left            PSV (cm/s)  EDV (cm/s)  AP (cm)   RAR    Prox BIANKA               115           0      1 1          Dist BIANKA               133           0                   Prox  EIA              102           6                   Dist EIA               128           0      1 0          Prox Renal              81          19           1 32             CONCLUSION:     Impression  The abdominal aorta is patent and normal in caliber  Mild diffuse  atherosclerotic plaque was visualized in the distal segment  Bilateral common and external iliac arteries are patent and normal caliber  There 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  Patent superior mesenteric and bilateral renal arteries  Physical Exam:    General appearance: alert and oriented, in no acute distress  Skin: Skin color, texture, turgor normal  No rashes or lesions  Neurologic: Grossly normal  Head: Normocephalic, without obvious abnormality, atraumatic  Eyes: PERRL, EOMI, sclera nonicteric  Throat: lips, mucosa, and tongue normal; teeth and gums normal  Neck: no adenopathy, no carotid bruit, no JVD, supple, symmetrical, trachea midline and thyroid not enlarged, symmetric, no tenderness/mass/nodules  Back: symmetric, no curvature  ROM normal  No CVA tenderness    Lungs: clear to auscultation bilaterally  Chest wall: no tenderness  Heart: regular rate and rhythm, S1, S2 normal, no murmur, click, rub or gallop  Abdomen: soft, non-tender; bowel sounds normal; no masses,  no organomegaly and no Abdominal or femoral bruits  Extremities: extremities normal, warm and well-perfused; no cyanosis, clubbing, or edema    Pulse exam:  Radial: Right: 2+ Left[de-identified] 2+  Femoral: Right: 2+ Left: 2+  Popliteal: Right: 2+ Left: 2+  DP: Right: 2+ Left: 2+  PT: Right: 1+ Left: 1+

## 2019-08-13 ENCOUNTER — OFFICE VISIT (OUTPATIENT)
Dept: VASCULAR SURGERY | Facility: CLINIC | Age: 79
End: 2019-08-13
Payer: MEDICARE

## 2019-08-13 VITALS
TEMPERATURE: 96.7 F | WEIGHT: 122 LBS | DIASTOLIC BLOOD PRESSURE: 76 MMHG | BODY MASS INDEX: 21.62 KG/M2 | RESPIRATION RATE: 16 BRPM | HEART RATE: 68 BPM | HEIGHT: 63 IN | SYSTOLIC BLOOD PRESSURE: 118 MMHG

## 2019-08-13 DIAGNOSIS — I70.0 AORTO-ILIAC ATHEROSCLEROSIS (HCC): Primary | ICD-10-CM

## 2019-08-13 DIAGNOSIS — I70.8 AORTO-ILIAC ATHEROSCLEROSIS (HCC): Primary | ICD-10-CM

## 2019-08-13 DIAGNOSIS — M35.3 POLYMYALGIA RHEUMATICA (HCC): ICD-10-CM

## 2019-08-13 DIAGNOSIS — F41.1 GENERALIZED ANXIETY DISORDER: ICD-10-CM

## 2019-08-13 PROCEDURE — 99214 OFFICE O/P EST MOD 30 MIN: CPT | Performed by: PHYSICIAN ASSISTANT

## 2019-08-13 NOTE — ASSESSMENT & PLAN NOTE
72-year-old female with a history GERD, general anxiety disorder, rheumatoid arthritis, PMR, chronic steroid therapy,  severe lumbar spinal stenosis, s/p complex fusion with chronic pain syndrome and asymptomatic mild aortoiliac atherosclerosis on CT and noninvasive imaging with palpable lower extremity pulses  -no evidence arterial occlusive disease    No further imaging studies or follow-up required  -return to office PRN  -continue ASA and statin therapy  -instructed to contact the office in the future if she would develop new symptoms

## 2019-08-13 NOTE — PATIENT INSTRUCTIONS
-your recent abdominal ultrasound shows no evidence of aortic or iliac disease and no evidence of abdominal aortic aneurysm  -no further imaging studies or surveillance required  -return to office as needed    Please contact our office in the future with any new symptoms  -continue aspirin and pravastatin

## 2019-08-23 ENCOUNTER — APPOINTMENT (OUTPATIENT)
Dept: RADIOLOGY | Age: 79
End: 2019-08-23
Payer: MEDICARE

## 2019-08-23 ENCOUNTER — TRANSCRIBE ORDERS (OUTPATIENT)
Dept: ADMINISTRATIVE | Facility: HOSPITAL | Age: 79
End: 2019-08-23

## 2019-08-23 DIAGNOSIS — M05.79 RHEU ARTHRITIS W RHEU FACTOR MULT SITE W/O ORG/SYS INVOLV (HCC): ICD-10-CM

## 2019-08-23 DIAGNOSIS — M05.79 RHEU ARTHRITIS W RHEU FACTOR MULT SITE W/O ORG/SYS INVOLV (HCC): Primary | ICD-10-CM

## 2019-08-23 PROCEDURE — 71046 X-RAY EXAM CHEST 2 VIEWS: CPT

## 2019-10-23 ENCOUNTER — TELEPHONE (OUTPATIENT)
Dept: PAIN MEDICINE | Facility: CLINIC | Age: 79
End: 2019-10-23

## 2019-10-23 NOTE — TELEPHONE ENCOUNTER
Patient called to request BRIAN from dr Tuan Rodriguez to dr Nickie Johnson in [place]  Aware location  This is for her lower back pain  Ph [phone]  In basket sent to [facility]

## 2019-10-28 ENCOUNTER — TELEPHONE (OUTPATIENT)
Dept: RADIOLOGY | Facility: CLINIC | Age: 79
End: 2019-10-28

## 2019-10-28 ENCOUNTER — TELEPHONE (OUTPATIENT)
Dept: PAIN MEDICINE | Facility: CLINIC | Age: 79
End: 2019-10-28

## 2019-10-28 NOTE — TELEPHONE ENCOUNTER
LMOM FOR PT TO CB TO SCHEDULE- PLEASE FOLLOW DR Liriano Link INSTRUCTIONS ON A 30 MIN FOLLOW UP SINCE PATIENT IS NEW TO HIM  THANK YOU      ----- Message from Valdo Potts DO sent at 10/25/2019  7:56 AM EDT -----  Regarding: RE: BRIAN  Contact: [phone]  Okay to transfer  Please schedule for 30 minutes office visit since this is my 1st time seeing patient  ----- Message -----  From: Gege Mcallister  Sent: 10/23/2019   2:27 PM EDT  To: John Pate DO  Subject: FW: Jose Angel Johnson has released this patient do you, will you accept?    ----- Message -----  From: Dayana Samano DO  Sent: 10/23/2019  12:38 PM EDT  To: Ezra Beck  Subject: RE: Darío Lord    ----- Message -----  From: Waldron Lefort  Sent: 10/23/2019  12:27 PM EDT  To: Ana Paula Palmer DO  Subject: Jose Angel Reinoso                                              Patient is requesting a BRIAN from dr Duane Cranker to dr Antoine Littlejohn   Call back# [phone]

## 2019-10-28 NOTE — TELEPHONE ENCOUNTER
----- Message from Tg Huerta DO sent at 10/25/2019  7:56 AM EDT -----  Regarding: RE: BRIAN  Contact: [phone]  Okay to transfer  Please schedule for 30 minutes office visit since this is my 1st time seeing patient  ----- Message -----  From: Warden Rojas  Sent: 10/23/2019   2:27 PM EDT  To: Dajuan Pang DO  Subject: FW: NEGRITO Ashton has released this patient do you, will you accept?    ----- Message -----  From: Elvis Gastelum DO  Sent: 10/23/2019  12:38 PM EDT  To: Oanh Crouch  Subject: RE: Dora Pearson    ----- Message -----  From: Coco Adrian  Sent: 10/23/2019  12:27 PM EDT  To: Laney Wilkinson DO  Subject: NEGRITO GASCA                                              Patient is requesting a BRIAN from dr Codi Arias to dr Alyson Navarrete   Call back# [phone]

## 2020-02-14 ENCOUNTER — CONSULT (OUTPATIENT)
Dept: PLASTIC SURGERY | Facility: CLINIC | Age: 80
End: 2020-02-14
Payer: MEDICARE

## 2020-02-14 VITALS
HEART RATE: 63 BPM | SYSTOLIC BLOOD PRESSURE: 162 MMHG | HEIGHT: 62 IN | RESPIRATION RATE: 16 BRPM | TEMPERATURE: 97.9 F | WEIGHT: 129.2 LBS | BODY MASS INDEX: 23.77 KG/M2 | DIASTOLIC BLOOD PRESSURE: 80 MMHG

## 2020-02-14 DIAGNOSIS — H61.002 CHONDRODERMATITIS NODULARIS HELICIS OF LEFT EAR: Primary | ICD-10-CM

## 2020-02-14 PROCEDURE — 99204 OFFICE O/P NEW MOD 45 MIN: CPT | Performed by: PHYSICIAN ASSISTANT

## 2020-02-14 NOTE — PROGRESS NOTES
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  She is referred to us by Dr Dylan Wilson  Please see HPI  I discussed with her the option to try anti pressure measures to the left ear to see if this will resolve on its own  If not the next step would be excision with possible advancement flaps verses full-thickness skin graft  She understood and agreed and would like to try anti pressure measures 1st   We will see her back in 2 months to see if there is any improvement  Diagnoses and all orders for this visit:    Chondrodermatitis nodularis helicis of left ear          Subjective:      Patient ID: Lani Schumacher is a 79 y o  female  HPI   She reports over the last 5 months pain of the left ear and scaly skin  She saw 2 dermatologists of whom recommended she be seen by plastic surgeon  No biopsies were taken  The following portions of the patient's history were reviewed and updated as appropriate: She  has a past medical history of Anxiety, Osteoporosis, Polymyalgia ([address] ), Radiculopathy of lumbar region, Rheumatoid arthritis ([address] ), Rheumatoid arthritis ([address] ), and Squamous cell carcinoma  She  has a past surgical history that includes Lumbar fusion  Her family history is not on file  She  reports that she has quit smoking  She does not have any smokeless tobacco history on file  She reports that she does not drink alcohol or use drugs       Review of Systems   HENT: Negative for hearing loss  Eyes: Negative for visual disturbance  Wears eyeglasses  Respiratory: Negative for shortness of breath  Cardiovascular: Negative for chest pain  Gastrointestinal: Negative for abdominal pain, blood in stool, constipation, diarrhea, nausea and vomiting  Genitourinary: Negative for hematuria  Musculoskeletal: Negative for gait problem  As rheumatoid arthritis  Skin:        As per HPI  Neurological: Negative for seizures and headaches  Hematological: Does not bruise/bleed easily  Psychiatric/Behavioral: The patient is not nervous/anxious  Objective: There were no vitals taken for this visit  Physical Exam   Constitutional: She is oriented to person, place, and time  She appears well-developed and well-nourished  No distress  HENT:   Head: Normocephalic and atraumatic  Eyes: Pupils are equal, round, and reactive to light  EOM are normal  No scleral icterus  Neck: Neck supple  No tracheal deviation present  No thyromegaly present  Cardiovascular: Normal rate and regular rhythm  Exam reveals no gallop and no friction rub  No murmur heard  Pulmonary/Chest: Effort normal and breath sounds normal  She has no wheezes  She has no rales  Abdominal: Soft  Bowel sounds are normal  She exhibits no distension  There is no tenderness  There is no rebound and no guarding  Musculoskeletal: Normal range of motion  Lymphadenopathy:     She has no cervical adenopathy  Neurological: She is alert and oriented to person, place, and time  No cranial nerve deficit  Skin:   Left ear superior aspect, helix of the ear is a scaled area which is very tender to it measures approximately 2 cm in length  Please see photos  Psychiatric: She has a normal mood and affect

## 2020-02-14 NOTE — LETTER
February 14, 2020     Henok Chung, [address] [facility]    Patient: Pancho Marroquin   YOB: 1940   Date of Visit: 2/14/2020       Dear Dr Claudia Sánchez: Thank you for referring Suma Dasilvasebas to me for evaluation  Below are my notes for this consultation  If you have questions, please do not hesitate to call me  I look forward to following your patient along with you  Sincerely,        Yuri Mcneil PA-C        CC: MD Yuri Jaramillo PA-C  2/14/2020 10:00 AM  Sign at close encounter  Assessment/Plan:  Ximena Sam 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  She is referred to us by Dr Genny Gerardo  Please see HPI  I discussed with her the option to try anti pressure measures to the left ear to see if this will resolve on its own  If not the next step would be excision with possible advancement flaps verses full-thickness skin graft  She understood and agreed and would like to try anti pressure measures 1st   We will see her back in 2 months to see if there is any improvement  Diagnoses and all orders for this visit:    Chondrodermatitis nodularis helicis of left ear          Subjective:      Patient ID: Pancho Marroquin is a 79 y o  female  HPI   She reports over the last 5 months pain of the left ear and scaly skin  She saw 2 dermatologists of whom recommended she be seen by plastic surgeon  No biopsies were taken  The following portions of the patient's history were reviewed and updated as appropriate: She  has a past medical history of Anxiety, Osteoporosis, Polymyalgia ([address] ), Radiculopathy of lumbar region, Rheumatoid arthritis ([address] ), Rheumatoid arthritis ([address] ), and Squamous cell carcinoma  She  has a past surgical history that includes Lumbar fusion  Her family history is not on file  She  reports that she has quit smoking   She does not have any smokeless tobacco history on file  She reports that she does not drink alcohol or use drugs       Review of Systems   HENT: Negative for hearing loss  Eyes: Negative for visual disturbance  Wears eyeglasses  Respiratory: Negative for shortness of breath  Cardiovascular: Negative for chest pain  Gastrointestinal: Negative for abdominal pain, blood in stool, constipation, diarrhea, nausea and vomiting  Genitourinary: Negative for hematuria  Musculoskeletal: Negative for gait problem  As rheumatoid arthritis  Skin:        As per HPI  Neurological: Negative for seizures and headaches  Hematological: Does not bruise/bleed easily  Psychiatric/Behavioral: The patient is not nervous/anxious  Objective: There were no vitals taken for this visit  Physical Exam   Constitutional: She is oriented to person, place, and time  She appears well-developed and well-nourished  No distress  HENT:   Head: Normocephalic and atraumatic  Eyes: Pupils are equal, round, and reactive to light  EOM are normal  No scleral icterus  Neck: Neck supple  No tracheal deviation present  No thyromegaly present  Cardiovascular: Normal rate and regular rhythm  Exam reveals no gallop and no friction rub  No murmur heard  Pulmonary/Chest: Effort normal and breath sounds normal  She has no wheezes  She has no rales  Abdominal: Soft  Bowel sounds are normal  She exhibits no distension  There is no tenderness  There is no rebound and no guarding  Musculoskeletal: Normal range of motion  Lymphadenopathy:     She has no cervical adenopathy  Neurological: She is alert and oriented to person, place, and time  No cranial nerve deficit  Skin:   Left ear superior aspect, helix of the ear is a scaled area which is very tender to it measures approximately 2 cm in length  Please see photos  Psychiatric: She has a normal mood and affect

## 2020-11-29 NOTE — ASSESSMENT & PLAN NOTE
69-year-old female with a history GERD, general anxiety disorder, rheumatoid arthritis, PMR, chronic steroid therapy,  severe lumbar spinal stenosis, s/p complex fusion with chronic pain syndrome and aortoiliac atherosclerosis on CT imaging who presents to the office on self-referral for evaluation of possible aortoiliac disease  Patient asymptomatic with readily palpable lower extremity pulses    -will obtain AOIL for baseline study  -return to office after noninvasive imaging for review results  -continue ASA  -instructed to contact the office in the interim with any questions, concerns or new symptoms pt had LT [facility] 11/20, refused SNF >> went home and fell 11/24 >> [facility], + COVID >> [facility].

## 2021-05-06 ENCOUNTER — APPOINTMENT (OUTPATIENT)
Dept: LAB | Age: 81
End: 2021-05-06
Payer: MEDICARE

## 2021-05-06 ENCOUNTER — TRANSCRIBE ORDERS (OUTPATIENT)
Dept: ADMINISTRATIVE | Age: 81
End: 2021-05-06

## 2021-05-06 DIAGNOSIS — M05.79 SEROPOSITIVE RHEUMATOID ARTHRITIS OF MULTIPLE SITES (HCC): Primary | ICD-10-CM

## 2021-05-06 DIAGNOSIS — M05.79 SEROPOSITIVE RHEUMATOID ARTHRITIS OF MULTIPLE SITES (HCC): ICD-10-CM

## 2021-05-06 LAB
ALBUMIN SERPL BCP-MCNC: 3.9 G/DL (ref 3.5–5)
ALP SERPL-CCNC: 47 U/L (ref 46–116)
ALT SERPL W P-5'-P-CCNC: 33 U/L (ref 12–78)
AST SERPL W P-5'-P-CCNC: 25 U/L (ref 5–45)
BASOPHILS # BLD AUTO: 0.03 THOUSANDS/ΜL (ref 0–0.1)
BASOPHILS NFR BLD AUTO: 1 % (ref 0–1)
BILIRUB DIRECT SERPL-MCNC: 0.1 MG/DL (ref 0–0.2)
BILIRUB SERPL-MCNC: 0.33 MG/DL (ref 0.2–1)
CREAT SERPL-MCNC: 0.83 MG/DL (ref 0.6–1.3)
CRP SERPL QL: 8 MG/L
EOSINOPHIL # BLD AUTO: 0.01 THOUSAND/ΜL (ref 0–0.61)
EOSINOPHIL NFR BLD AUTO: 0 % (ref 0–6)
ERYTHROCYTE [DISTWIDTH] IN BLOOD BY AUTOMATED COUNT: 12.9 % (ref 11.6–15.1)
ERYTHROCYTE [SEDIMENTATION RATE] IN BLOOD: 32 MM/HOUR (ref 0–29)
GFR SERPL CREATININE-BSD FRML MDRD: 67 ML/MIN/1.73SQ M
HCT VFR BLD AUTO: 36 % (ref 34.8–46.1)
HGB BLD-MCNC: 11.5 G/DL (ref 11.5–15.4)
IMM GRANULOCYTES # BLD AUTO: 0.05 THOUSAND/UL (ref 0–0.2)
IMM GRANULOCYTES NFR BLD AUTO: 1 % (ref 0–2)
LYMPHOCYTES # BLD AUTO: 0.54 THOUSANDS/ΜL (ref 0.6–4.47)
LYMPHOCYTES NFR BLD AUTO: 9 % (ref 14–44)
MCH RBC QN AUTO: 33.6 PG (ref 26.8–34.3)
MCHC RBC AUTO-ENTMCNC: 31.9 G/DL (ref 31.4–37.4)
MCV RBC AUTO: 105 FL (ref 82–98)
MONOCYTES # BLD AUTO: 0.41 THOUSAND/ΜL (ref 0.17–1.22)
MONOCYTES NFR BLD AUTO: 7 % (ref 4–12)
NEUTROPHILS # BLD AUTO: 4.84 THOUSANDS/ΜL (ref 1.85–7.62)
NEUTS SEG NFR BLD AUTO: 82 % (ref 43–75)
NRBC BLD AUTO-RTO: 0 /100 WBCS
PLATELET # BLD AUTO: 302 THOUSANDS/UL (ref 149–390)
PMV BLD AUTO: 10 FL (ref 8.9–12.7)
PROT SERPL-MCNC: 7.6 G/DL (ref 6.4–8.2)
RBC # BLD AUTO: 3.42 MILLION/UL (ref 3.81–5.12)
WBC # BLD AUTO: 5.88 THOUSAND/UL (ref 4.31–10.16)

## 2021-05-06 PROCEDURE — 86200 CCP ANTIBODY: CPT

## 2021-05-06 PROCEDURE — 82565 ASSAY OF CREATININE: CPT

## 2021-05-06 PROCEDURE — 86140 C-REACTIVE PROTEIN: CPT

## 2021-05-06 PROCEDURE — 80076 HEPATIC FUNCTION PANEL: CPT

## 2021-05-06 PROCEDURE — 86430 RHEUMATOID FACTOR TEST QUAL: CPT

## 2021-05-06 PROCEDURE — 36415 COLL VENOUS BLD VENIPUNCTURE: CPT

## 2021-05-06 PROCEDURE — 85652 RBC SED RATE AUTOMATED: CPT

## 2021-05-06 PROCEDURE — 85025 COMPLETE CBC W/AUTO DIFF WBC: CPT

## 2021-05-07 LAB
CCP AB SER IA-ACNC: 1.4
RHEUMATOID FACT SER QL LA: NEGATIVE

## 2021-05-10 ENCOUNTER — APPOINTMENT (OUTPATIENT)
Dept: LAB | Facility: HOSPITAL | Age: 81
End: 2021-05-10
Attending: INTERNAL MEDICINE
Payer: MEDICARE

## 2021-05-10 ENCOUNTER — TRANSCRIBE ORDERS (OUTPATIENT)
Dept: LAB | Facility: HOSPITAL | Age: 81
End: 2021-05-10

## 2021-05-10 DIAGNOSIS — M81.0 SENILE OSTEOPOROSIS: ICD-10-CM

## 2021-05-10 DIAGNOSIS — M81.0 SENILE OSTEOPOROSIS: Primary | ICD-10-CM

## 2021-05-10 LAB
25(OH)D3 SERPL-MCNC: 42.8 NG/ML (ref 30–100)
CALCIUM SERPL-MCNC: 10.3 MG/DL (ref 8.3–10.1)

## 2021-05-10 PROCEDURE — 36415 COLL VENOUS BLD VENIPUNCTURE: CPT

## 2021-05-10 PROCEDURE — 82310 ASSAY OF CALCIUM: CPT

## 2021-05-10 PROCEDURE — 82306 VITAMIN D 25 HYDROXY: CPT
